# Patient Record
Sex: FEMALE
[De-identification: names, ages, dates, MRNs, and addresses within clinical notes are randomized per-mention and may not be internally consistent; named-entity substitution may affect disease eponyms.]

---

## 2024-10-31 ENCOUNTER — RX ONLY (OUTPATIENT)
Age: 29
Setting detail: RX ONLY
End: 2024-10-31

## 2024-10-31 ENCOUNTER — APPOINTMENT (RX ONLY)
Dept: URBAN - METROPOLITAN AREA CLINIC 34 | Facility: CLINIC | Age: 29
Setting detail: DERMATOLOGY
End: 2024-10-31

## 2024-10-31 DIAGNOSIS — L21.8 OTHER SEBORRHEIC DERMATITIS: ICD-10-CM | Status: INADEQUATELY CONTROLLED

## 2024-10-31 PROCEDURE — 99204 OFFICE O/P NEW MOD 45 MIN: CPT

## 2024-10-31 PROCEDURE — ? PRESCRIPTION MEDICATION MANAGEMENT

## 2024-10-31 PROCEDURE — ? COUNSELING

## 2024-10-31 RX ORDER — BIMATOPROST 0.3 MG/ML
SOLUTION/ DROPS OPHTHALMIC
Qty: 5 | Refills: 4 | Status: ERX | COMMUNITY
Start: 2024-10-31

## 2024-10-31 RX ORDER — KETOCONAZOLE 20 MG/ML
SHAMPOO, SUSPENSION TOPICAL
Qty: 360 | Refills: 3 | Status: ERX | COMMUNITY
Start: 2024-10-31

## 2024-10-31 RX ORDER — CICLOPIROX OLAMINE 7.7 MG/G
CREAM TOPICAL
Qty: 60 | Refills: 3 | Status: ERX | COMMUNITY
Start: 2024-10-31

## 2024-10-31 RX ORDER — ROFLUMILAST 3 MG/G
AEROSOL, FOAM TOPICAL
Qty: 60 | Refills: 5 | Status: ERX | COMMUNITY
Start: 2024-10-31

## 2024-10-31 RX ORDER — AZELAIC ACID 0.15 G/G
GEL TOPICAL
Qty: 50 | Refills: 3 | Status: ERX | COMMUNITY
Start: 2024-10-31

## 2024-10-31 ASSESSMENT — LOCATION DETAILED DESCRIPTION DERM
LOCATION DETAILED: RIGHT SUPERIOR OCCIPITAL SCALP
LOCATION DETAILED: LEFT MEDIAL FRONTAL SCALP

## 2024-10-31 ASSESSMENT — LOCATION SIMPLE DESCRIPTION DERM
LOCATION SIMPLE: LEFT SCALP
LOCATION SIMPLE: RIGHT OCCIPITAL SCALP

## 2024-10-31 ASSESSMENT — LOCATION ZONE DERM: LOCATION ZONE: SCALP

## 2024-10-31 NOTE — PROCEDURE: PRESCRIPTION MEDICATION MANAGEMENT
Initiate Treatment: Ketoconazole Shampoo: use to wash scalp weekly.\\nZoryve foam: apply to scalp qd
Render In Strict Bullet Format?: No
Detail Level: Zone

## 2024-11-15 ENCOUNTER — RX ONLY (OUTPATIENT)
Age: 29
Setting detail: RX ONLY
End: 2024-11-15

## 2024-11-15 RX ORDER — TRETIONIN 0.5 MG/G
CREAM TOPICAL
Qty: 45 | Refills: 5 | Status: ERX | COMMUNITY
Start: 2024-11-15

## 2025-03-07 ENCOUNTER — RX ONLY (RX ONLY)
Age: 30
End: 2025-03-07

## 2025-03-07 RX ORDER — HYDROXYZINE PAMOATE 25 MG/1
CAPSULE ORAL
Qty: 60 | Refills: 0 | Status: ERX | COMMUNITY
Start: 2025-03-07

## 2025-03-21 ENCOUNTER — RX ONLY (RX ONLY)
Age: 30
End: 2025-03-21

## 2025-03-21 RX ORDER — AZELAIC ACID 0.15 G/G
GEL TOPICAL
Qty: 50 | Refills: 2 | Status: ERX

## 2025-03-21 RX ORDER — MINOXIDIL
POWDER (GRAM) MISCELLANEOUS
Qty: 30 | Refills: 2 | Status: ERX | COMMUNITY
Start: 2025-03-21

## 2025-03-21 RX ORDER — DOXYCYCLINE HYCLATE 100 MG/1
CAPSULE, GELATIN COATED ORAL
Qty: 180 | Refills: 2 | Status: ERX | COMMUNITY
Start: 2025-03-21

## 2025-03-21 RX ORDER — DOXYCYCLINE 40 MG/1
CAPSULE ORAL
Qty: 30 | Refills: 4 | Status: ERX | COMMUNITY
Start: 2025-03-21

## 2025-03-28 ENCOUNTER — RX ONLY (RX ONLY)
Age: 30
End: 2025-03-28

## 2025-03-28 RX ORDER — SULFACETAMIDE SODIUM, SULFUR 100; 50 MG/G; MG/G
EMULSION TOPICAL
Qty: 227 | Refills: 6 | Status: ERX | COMMUNITY
Start: 2025-03-28

## 2025-05-06 ENCOUNTER — RX ONLY (RX ONLY)
Age: 30
End: 2025-05-06

## 2025-05-06 RX ORDER — TRIAMCINOLONE ACETONIDE 1 MG/G
OINTMENT TOPICAL
Qty: 80 | Refills: 1 | Status: ERX | COMMUNITY
Start: 2025-05-06

## 2025-07-14 ENCOUNTER — RX ONLY (RX ONLY)
Age: 30
End: 2025-07-14

## 2025-07-14 RX ORDER — DOXYCYCLINE HYCLATE 100 MG/1
CAPSULE, GELATIN COATED ORAL
Qty: 180 | Refills: 2 | Status: ERX

## 2025-07-14 RX ORDER — VALACYCLOVIR HYDROCHLORIDE 1 G/1
TABLET, FILM COATED ORAL
Qty: 30 | Refills: 2 | Status: ERX | COMMUNITY
Start: 2025-07-14

## 2025-07-14 RX ORDER — DOXYCYCLINE 40 MG/1
CAPSULE ORAL
Qty: 30 | Refills: 2 | Status: ERX

## 2025-08-05 ENCOUNTER — RX ONLY (RX ONLY)
Age: 30
End: 2025-08-05

## 2025-08-05 RX ORDER — MINOXIDIL
POWDER (GRAM) MISCELLANEOUS
Qty: 30 | Refills: 5 | Status: ERX